# Patient Record
Sex: MALE | Race: OTHER | URBAN - METROPOLITAN AREA
[De-identification: names, ages, dates, MRNs, and addresses within clinical notes are randomized per-mention and may not be internally consistent; named-entity substitution may affect disease eponyms.]

---

## 2017-12-08 PROBLEM — R91.8 LUNG MASS: Status: ACTIVE | Noted: 2017-12-08

## 2017-12-12 ENCOUNTER — TELEPHONE (OUTPATIENT)
Dept: PULMONOLOGY | Age: 52
End: 2017-12-12

## 2017-12-12 NOTE — TELEPHONE ENCOUNTER
Spoke to patient's daughter (listed as his emergency contact and only contact number) and discussed the results of CT guided lung biopsy showing lung cancer. We discussed the importance of following up with oncology as soon as possible. She expresses understanding.

## 2018-03-06 ENCOUNTER — OFFICE VISIT (OUTPATIENT)
Dept: FAMILY MEDICINE CLINIC | Age: 53
End: 2018-03-06

## 2018-03-06 VITALS
RESPIRATION RATE: 12 BRPM | WEIGHT: 117.8 LBS | HEART RATE: 96 BPM | TEMPERATURE: 99.5 F | SYSTOLIC BLOOD PRESSURE: 100 MMHG | DIASTOLIC BLOOD PRESSURE: 68 MMHG | OXYGEN SATURATION: 98 % | BODY MASS INDEX: 23.13 KG/M2 | HEIGHT: 60 IN

## 2018-03-06 DIAGNOSIS — M10.079 IDIOPATHIC GOUT INVOLVING TOE, UNSPECIFIED CHRONICITY, UNSPECIFIED LATERALITY: ICD-10-CM

## 2018-03-06 DIAGNOSIS — C34.91 NON-SMALL CELL LUNG CANCER, RIGHT (HCC): Primary | ICD-10-CM

## 2018-03-06 DIAGNOSIS — I48.0 PAROXYSMAL ATRIAL FIBRILLATION (HCC): ICD-10-CM

## 2018-03-06 DIAGNOSIS — R63.0 LACK OF APPETITE: ICD-10-CM

## 2018-03-06 DIAGNOSIS — R05.3 CHRONIC COUGHING: ICD-10-CM

## 2018-03-06 PROBLEM — I48.91 ATRIAL FIBRILLATION WITH RVR (HCC): Status: ACTIVE | Noted: 2018-01-25

## 2018-03-06 PROBLEM — I48.19 PERSISTENT ATRIAL FIBRILLATION (HCC): Status: ACTIVE | Noted: 2018-01-25

## 2018-03-06 PROBLEM — Z01.818 PRE-OP EVALUATION: Status: ACTIVE | Noted: 2018-01-19

## 2018-03-06 PROBLEM — J15.211 MSSA (METHICILLIN SUSCEPTIBLE STAPHYLOCOCCUS AUREUS) PNEUMONIA (HCC): Status: RESOLVED | Noted: 2018-01-25 | Resolved: 2018-03-06

## 2018-03-06 PROBLEM — M10.9 GOUT: Status: ACTIVE | Noted: 2018-01-25

## 2018-03-06 PROBLEM — C34.81 MALIGNANT NEOPLASM OF OVERLAPPING SITES OF RIGHT LUNG (HCC): Status: ACTIVE | Noted: 2018-01-04

## 2018-03-06 PROBLEM — J98.8 RESPIRATORY OBSTRUCTION: Status: ACTIVE | Noted: 2018-01-19

## 2018-03-06 PROBLEM — J15.211 MSSA (METHICILLIN SUSCEPTIBLE STAPHYLOCOCCUS AUREUS) PNEUMONIA (HCC): Status: ACTIVE | Noted: 2018-01-25

## 2018-03-06 PROCEDURE — 99203 OFFICE O/P NEW LOW 30 MIN: CPT | Performed by: FAMILY MEDICINE

## 2018-03-06 RX ORDER — DEXAMETHASONE 4 MG/1
TABLET ORAL
COMMUNITY
Start: 2018-02-07

## 2018-03-06 RX ORDER — SODIUM CHLORIDE FOR INHALATION 3 %
3 VIAL, NEBULIZER (ML) INHALATION
COMMUNITY
Start: 2018-01-26 | End: 2018-03-27

## 2018-03-06 RX ORDER — GUAIFENESIN AND DEXTROMETHORPHAN HYDROBROMIDE 1200; 60 MG/1; MG/1
1 TABLET, EXTENDED RELEASE ORAL EVERY 12 HOURS PRN
Qty: 28 TABLET | Refills: 1 | Status: SHIPPED | OUTPATIENT
Start: 2018-03-06 | End: 2018-05-05

## 2018-03-06 RX ORDER — ONDANSETRON HYDROCHLORIDE 8 MG/1
TABLET, FILM COATED ORAL
COMMUNITY
Start: 2018-02-07

## 2018-03-06 RX ORDER — METOPROLOL TARTRATE 50 MG/1
50 TABLET, FILM COATED ORAL
COMMUNITY
End: 2018-03-06 | Stop reason: SDUPTHER

## 2018-03-06 RX ORDER — LORAZEPAM 0.5 MG/1
0.5 TABLET ORAL
COMMUNITY
Start: 2018-02-07 | End: 2018-03-09

## 2018-03-06 RX ORDER — PROCHLORPERAZINE MALEATE 10 MG
10 TABLET ORAL
COMMUNITY
Start: 2018-02-07

## 2018-03-06 RX ORDER — ALOE VERA
GEL (GRAM) TOPICAL
COMMUNITY
Start: 2018-01-26

## 2018-03-06 RX ORDER — FLUTICASONE PROPIONATE 50 MCG
1 SPRAY, SUSPENSION (ML) NASAL
COMMUNITY
Start: 2018-02-05

## 2018-03-06 RX ORDER — POTASSIUM CHLORIDE 20 MEQ/1
20 TABLET, EXTENDED RELEASE ORAL
COMMUNITY
Start: 2018-03-01

## 2018-03-06 RX ORDER — BENZONATATE 200 MG/1
200 CAPSULE ORAL 3 TIMES DAILY PRN
Qty: 90 CAPSULE | Refills: 1 | Status: SHIPPED | OUTPATIENT
Start: 2018-03-06 | End: 2018-04-05

## 2018-03-06 RX ORDER — SODIUM CHLORIDE FOR INHALATION 0.9 %
3 VIAL, NEBULIZER (ML) INHALATION
COMMUNITY
Start: 2018-01-26 | End: 2018-03-27

## 2018-03-06 RX ORDER — METOPROLOL TARTRATE 100 MG/1
TABLET ORAL
COMMUNITY
Start: 2018-03-06

## 2018-03-06 RX ORDER — FOLIC ACID 1 MG/1
1 TABLET ORAL
COMMUNITY
Start: 2018-02-07

## 2018-03-06 RX ORDER — MIRTAZAPINE 15 MG/1
15 TABLET, FILM COATED ORAL
COMMUNITY
Start: 2018-02-07

## 2018-03-06 NOTE — PROGRESS NOTES
Michaelle Side   YOB: 1965    Date of Visit:  3/6/2018        Chief Complaint   Patient presents with   Brandi Prakash Doctor     New patient visit         HPI:      Patient is a 50-year-old male with medical history of metastatic non-small cell lung cancer, proximal atrial fibrillation and gout presenting to [facility] primary care. Patient was diagnosed with lung cancer 12/8/17 with lung bx results. Has history of airway obstruction secondary to tumor and underwent removal and placement of stent on 1/24/18. Complicated by MSSA and was treated with Augmentin and Levaquin. Also found to have sign of sinusitis on imaging and prescribed Flonase. PriorLPFT results shows mildly decreased DLCO which was consistent with lung cancer. Hospital course also complicated by atrial fibrillation with RVR. Treated with metoprolol per Cardiology. Denies palpitations and chest pain. He reports a history of gout that is improved at this time. Complains of persistent and occasionally productive coughing at this time. Reports to decreased appetite since his cancer diagnosis and is on Remeron. Also, sometimes takes protein nutritional shakes. History taken from patient and chart records. No Known Allergies        Outpatient Prescriptions Marked as Taking for the 3/6/18 encounter (Office Visit) with Zeynep García MD   Medication Sig Dispense Refill    ondansetron (ZOFRAN) 8 MG tablet Take 16 mg by mouth daily starting the day after chemotherapy for two days.  folic acid (FOLVITE) 1 MG tablet Take 1 mg by mouth      mirtazapine (REMERON) 15 MG tablet Take 15 mg by mouth      potassium chloride (KLOR-CON M) 20 MEQ extended release tablet Take 20 mEq by mouth      dexamethasone (DECADRON) 4 MG tablet Take 4 mg twice a day for three days starting the day before pemetrexed.       fluticasone (FLONASE) 50 MCG/ACT nasal spray 1 spray by Nasal route      sodium chloride 3 % nebulizer solution Inhale 3 no tenderness. Musculoskeletal: He exhibits no edema or tenderness. Lymphadenopathy:     He has no cervical adenopathy. Neurological: He is alert. He is not disoriented. Vitals reviewed. Vitals:    03/06/18 1143   BP: 100/68   Site: Left Arm   Position: Sitting   Cuff Size: Medium Adult   Pulse: 96   Resp: 12   Temp: 99.5 °F (37.5 °C)   TempSrc: Oral   SpO2: 98%   Weight: 117 lb 12.8 oz (53.4 kg)   Height: 5' (1.524 m)     Body mass index is 23.01 kg/m². Wt Readings from Last 3 Encounters:   03/06/18 117 lb 12.8 oz (53.4 kg)   12/08/17 131 lb 9.8 oz (59.7 kg)   07/30/14 140 lb (63.5 kg)     BP Readings from Last 3 Encounters:   03/06/18 100/68   12/09/17 124/88   07/31/14 113/69            Assessment/Plan     1. Metastatic non-small cell lung cancer, right ([facility] [address]  - On chemotherapy. Following with Oncologist, Dr. Aretha Wing. 2. Paroxysmal atrial fibrillation (HCC)  - Rate and rhythm controlled on exam. Continue metoprolol as prescribed by Cardio. Following with Cardiologist, Dr. Emperatriz Boyle. 3. Lack of appetite  - Continue Remeron. Recommend supplementing diet with nutritional shakes at mealtimes. 4. Idiopathic gout involving toe  - Resolved at this time. 5. Chronic coughing  - CXR from 1/24/18 shows RUL mass, right hilar tissue consolidation and otherwise clear lungs. Continue fluticasone. Add on PRN Mucinex DM and benzonatate for added sx relief. Discussed medications with patient, who voiced understanding of their use and indications. All questions answered. Return in 6 months for chronic conditions, or sooner as needed for acute concerns. Please note that some or all of this record was generated using voice recognition software. If there are any questions about the content of this document, please contact the author as some errors in transcription may have occurred.

## 2018-03-10 PROBLEM — I48.0 PAROXYSMAL ATRIAL FIBRILLATION (HCC): Status: ACTIVE | Noted: 2018-01-25

## 2018-03-10 ASSESSMENT — ENCOUNTER SYMPTOMS
COUGH: 1
GASTROINTESTINAL NEGATIVE: 1

## 2018-06-05 ENCOUNTER — TELEPHONE (OUTPATIENT)
Dept: FAMILY MEDICINE CLINIC | Age: 53
End: 2018-06-05